# Patient Record
Sex: MALE | Race: BLACK OR AFRICAN AMERICAN | NOT HISPANIC OR LATINO
[De-identification: names, ages, dates, MRNs, and addresses within clinical notes are randomized per-mention and may not be internally consistent; named-entity substitution may affect disease eponyms.]

---

## 2021-02-16 PROBLEM — Z00.00 ENCOUNTER FOR PREVENTIVE HEALTH EXAMINATION: Status: ACTIVE | Noted: 2021-02-16

## 2021-02-19 DIAGNOSIS — Z78.9 OTHER SPECIFIED HEALTH STATUS: ICD-10-CM

## 2021-02-19 DIAGNOSIS — Z83.3 FAMILY HISTORY OF DIABETES MELLITUS: ICD-10-CM

## 2021-02-19 DIAGNOSIS — I10 ESSENTIAL (PRIMARY) HYPERTENSION: ICD-10-CM

## 2021-02-19 RX ORDER — LOSARTAN POTASSIUM 100 MG/1
TABLET, FILM COATED ORAL
Refills: 0 | Status: ACTIVE | COMMUNITY

## 2021-02-22 ENCOUNTER — APPOINTMENT (OUTPATIENT)
Dept: UROLOGY | Facility: CLINIC | Age: 71
End: 2021-02-22
Payer: MEDICARE

## 2021-02-22 VITALS
HEART RATE: 75 BPM | DIASTOLIC BLOOD PRESSURE: 106 MMHG | SYSTOLIC BLOOD PRESSURE: 152 MMHG | TEMPERATURE: 97.5 F | OXYGEN SATURATION: 97 %

## 2021-02-22 LAB
BILIRUB UR QL STRIP: NORMAL
CLARITY UR: CLEAR
COLLECTION METHOD: NORMAL
GLUCOSE UR-MCNC: NORMAL
HCG UR QL: 0.2 EU/DL
HGB UR QL STRIP.AUTO: NORMAL
KETONES UR-MCNC: NORMAL
LEUKOCYTE ESTERASE UR QL STRIP: NORMAL
NITRITE UR QL STRIP: NORMAL
PH UR STRIP: 5.5
PROT UR STRIP-MCNC: NORMAL
SP GR UR STRIP: 1

## 2021-02-22 PROCEDURE — 81003 URINALYSIS AUTO W/O SCOPE: CPT | Mod: QW

## 2021-02-22 PROCEDURE — 99215 OFFICE O/P EST HI 40 MIN: CPT | Mod: 25

## 2021-02-22 PROCEDURE — 99072 ADDL SUPL MATRL&STAF TM PHE: CPT

## 2021-02-22 PROCEDURE — 76857 US EXAM PELVIC LIMITED: CPT

## 2021-02-22 RX ORDER — ROSUVASTATIN CALCIUM 20 MG/1
20 TABLET, FILM COATED ORAL
Qty: 90 | Refills: 0 | Status: ACTIVE | COMMUNITY
Start: 2020-05-06

## 2021-02-22 RX ORDER — ATENOLOL 50 MG/1
50 TABLET ORAL
Qty: 90 | Refills: 0 | Status: ACTIVE | COMMUNITY
Start: 2021-01-29

## 2021-02-22 NOTE — LETTER BODY
[Dear  ___] : Dear  [unfilled], [Consult Letter:] : I had the pleasure of evaluating your patient, [unfilled]. [Please see my note below.] : Please see my note below. [Consult Closing:] : Thank you very much for allowing me to participate in the care of this patient.  If you have any questions, please do not hesitate to contact me. [Sincerely,] : Sincerely, [FreeTextEntry3] : Denton Núñez MD, FACS

## 2021-02-22 NOTE — HISTORY OF PRESENT ILLNESS
[FreeTextEntry1] : Mr. ANTIONETTE FISHER comes in today for his urologic follow-up.  He presents with mild lower urinary tract symptoms (predominantly frequency) with nocturia x1.  \par IPSS: 5/35\par Sono: 49cc PVR; 32cc prostate\par \par Due to his elevated PSA and a strong family history of prostate cancer, Mr. Fisher underwent a prostate biopsy in June 2019, which identified only BPH. \par \par He reports normal erections. \par \par PSAs: 5/20/19--4.35 (20%); 4/29/19--6.2 (21%); \par \par 4K: 5/20/19--11%;\par \par Prostate Bx: 6/17/19--BPH;

## 2021-02-22 NOTE — PHYSICAL EXAM
[General Appearance - Well Developed] : well developed [General Appearance - Well Nourished] : well nourished [Normal Appearance] : normal appearance [Well Groomed] : well groomed [General Appearance - In No Acute Distress] : no acute distress [Abdomen Soft] : soft [Abdomen Tenderness] : non-tender [Costovertebral Angle Tenderness] : no ~M costovertebral angle tenderness [Urinary Bladder Findings] : the bladder was normal on palpation [Urethral Meatus] : meatus normal [Scrotum] : the scrotum was normal [Testes Mass (___cm)] : there were no testicular masses [No Prostate Nodules] : no prostate nodules [Edema] : no peripheral edema [] : no respiratory distress [Respiration, Rhythm And Depth] : normal respiratory rhythm and effort [Exaggerated Use Of Accessory Muscles For Inspiration] : no accessory muscle use [Oriented To Time, Place, And Person] : oriented to person, place, and time [Affect] : the affect was normal [Mood] : the mood was normal [Not Anxious] : not anxious [Normal Station and Gait] : the gait and station were normal for the patient's age [No Focal Deficits] : no focal deficits [No Palpable Adenopathy] : no palpable adenopathy [Abdomen Mass (___ Cm)] : no abdominal mass palpated [Abdomen Hernia] : no hernia was discovered [Penis Abnormality] : normal circumcised penis [Testes Tenderness] : no tenderness of the testes [Prostate Tenderness] : the prostate was not tender [Skin Color & Pigmentation] : normal skin color and pigmentation [Heart Rate And Rhythm] : Heart rate and rhythm were normal

## 2021-02-22 NOTE — ADDENDUM
[FreeTextEntry1] : A portion of this note was written by [Sheldon Bunn] on 02/19/2021 acting as a scribe for Dr. Núñez. \par \par I have personally reviewed the chart and agree that the record accurately reflects my personal performance of the history, physical exam, assessment, and plan.

## 2021-02-22 NOTE — ASSESSMENT
[FreeTextEntry1] : I discussed the findings and options with . ANTIONETTE FISHER in detail.  No intervention is warranted for his minimal stable lower urinary tract symptoms.  Regarding the chronic PSA elevation, I will call him with the repeat.  I also advised that he consider a baseline prostate MRI understanding the limitations of this and any of the other available ancillary tests used for the diagnosis of prostate cancer.\par \par Providing there are no new problems I look forward to seeing Mr. Fisher in 1 year (bladder sono, PSA).\par

## 2021-02-23 LAB — PSA SERPL-MCNC: 4.6 NG/ML

## 2021-03-03 ENCOUNTER — NON-APPOINTMENT (OUTPATIENT)
Age: 71
End: 2021-03-03

## 2022-06-22 ENCOUNTER — APPOINTMENT (OUTPATIENT)
Dept: OTOLARYNGOLOGY | Facility: CLINIC | Age: 72
End: 2022-06-22

## 2023-03-06 ENCOUNTER — APPOINTMENT (OUTPATIENT)
Dept: UROLOGY | Facility: CLINIC | Age: 73
End: 2023-03-06
Payer: MEDICARE

## 2023-03-06 VITALS
OXYGEN SATURATION: 97 % | TEMPERATURE: 97 F | BODY MASS INDEX: 27.49 KG/M2 | DIASTOLIC BLOOD PRESSURE: 92 MMHG | HEART RATE: 78 BPM | HEIGHT: 65 IN | SYSTOLIC BLOOD PRESSURE: 166 MMHG | WEIGHT: 165 LBS

## 2023-03-06 LAB
BILIRUB UR QL STRIP: NORMAL
CLARITY UR: CLEAR
COLLECTION METHOD: NORMAL
GLUCOSE UR-MCNC: NORMAL
HCG UR QL: 0.2 EU/DL
HGB UR QL STRIP.AUTO: ABNORMAL
KETONES UR-MCNC: NORMAL
LEUKOCYTE ESTERASE UR QL STRIP: NORMAL
NITRITE UR QL STRIP: NORMAL
PH UR STRIP: 5
PROT UR STRIP-MCNC: NORMAL
SP GR UR STRIP: 1

## 2023-03-06 PROCEDURE — 81003 URINALYSIS AUTO W/O SCOPE: CPT | Mod: QW

## 2023-03-06 PROCEDURE — 51798 US URINE CAPACITY MEASURE: CPT

## 2023-03-06 PROCEDURE — 99215 OFFICE O/P EST HI 40 MIN: CPT | Mod: 25

## 2023-03-06 NOTE — ASSESSMENT
[FreeTextEntry1] : I discussed the findings and options with . ANTIONETTE ESCOBAR in detail and obtained the outside PSA values. He does not want any intervention for his stable voiding symptoms.  If this remains elevated, he should have a baseline prostate MRI and possibly a prostate biopsy.\par \par The two biopsy techniques (transrectal and transperineal) were described with their pros and cons. He understands that [facility] requires that the procedure be performed exclusively transperineally using a fusion technique. Currently this is performed in the operating room under general anesthesia.  Biopsy related complications were outlined including bleeding (urinary/rectal/ejaculatory), infection, urosepsis (requiring prompt hospitalization), urinary retention. \par

## 2023-03-06 NOTE — ADDENDUM
[FreeTextEntry1] : A portion of this note was written by [Sheldon Bunn] on 03/03/2023 acting as a scribe for Dr. Núñez. \par \par I have personally reviewed the chart and agree that the record accurately reflects my personal performance of the history, physical exam, assessment, and plan.

## 2023-03-06 NOTE — PHYSICAL EXAM

## 2023-03-06 NOTE — HISTORY OF PRESENT ILLNESS
[FreeTextEntry1] : Mr. ANTIONETTE FISHER comes in today for his urologic follow-up after a hiatus of two years.  \par \par From his general urologic history, Mr. Fisher reports moderate stable lower urinary tract symptoms (predominantly frequency) with nocturia x 2. He has good urinary control. \par IPSS: 18/35\par Sono (performed to assess bladder emptying): PVR 18 cc, 65 cc prostate\par \par Due to his elevated PSA and a strong family history of prostate cancer, Mr. Fisher underwent a prostate biopsy in June 2019, which identified only BPH. His most recent PSA is reportedly elevated (records unavailable for my review).\par \par He reports normal erections. \par \par PSAs: 1/13/23--5.94; 4/18/22--6.36; 2/22/21--4.6; 1/11/21--4.25; 5/20/19--4.35 (20%); 4/29/19--6.2 (21%); \par \par 4K: 5/20/19--11%;\par \par Pathology (Prostate Biopsy): 6/17/19--BPH;

## 2023-03-06 NOTE — LETTER BODY
[Dear  ___] : Dear  [unfilled], [Consult Letter:] : I had the pleasure of evaluating your patient, [unfilled]. [Please see my note below.] : Please see my note below. [Consult Closing:] : Thank you very much for allowing me to participate in the care of this patient.  If you have any questions, please do not hesitate to contact me. [Sincerely,] : Sincerely, [DrTreasure  ___] : Dr. MERCER [FreeTextEntry3] : Denton Núñez MD, FACS

## 2023-03-07 ENCOUNTER — NON-APPOINTMENT (OUTPATIENT)
Age: 73
End: 2023-03-07

## 2023-03-07 LAB — PSA SERPL-MCNC: 5.58 NG/ML

## 2023-03-08 LAB — BACTERIA UR CULT: NORMAL

## 2023-03-17 ENCOUNTER — OFFICE (OUTPATIENT)
Dept: URBAN - METROPOLITAN AREA CLINIC 28 | Facility: CLINIC | Age: 73
Setting detail: OPHTHALMOLOGY
End: 2023-03-17
Payer: COMMERCIAL

## 2023-03-17 DIAGNOSIS — H52.4: ICD-10-CM

## 2023-03-17 DIAGNOSIS — H11.152: ICD-10-CM

## 2023-03-17 DIAGNOSIS — H11.041: ICD-10-CM

## 2023-03-17 DIAGNOSIS — H16.223: ICD-10-CM

## 2023-03-17 PROBLEM — H52.7 REFRACTIVE ERROR: Status: ACTIVE | Noted: 2023-03-17

## 2023-03-17 PROCEDURE — 92012 INTRM OPH EXAM EST PATIENT: CPT | Performed by: SPECIALIST

## 2023-03-17 PROCEDURE — 92015 DETERMINE REFRACTIVE STATE: CPT | Performed by: SPECIALIST

## 2023-03-17 ASSESSMENT — AXIALLENGTH_DERIVED
OS_AL: 24.6184
OS_AL: 24.3561

## 2023-03-17 ASSESSMENT — TONOMETRY
OS_IOP_MMHG: 16
OD_IOP_MMHG: 14

## 2023-03-17 ASSESSMENT — REFRACTION_CURRENTRX
OS_VPRISM_DIRECTION: PROGS
OD_OVR_VA: 20/
OS_AXIS: 091
OD_CYLINDER: -1.00
OD_ADD: +2.50
OD_AXIS: 138
OS_ADD: +1.50
OS_SPHERE: +1.25
OS_OVR_VA: 20/
OS_OVR_VA: 20/
OS_SPHERE: +1.25
OS_ADD: +2.50
OS_AXIS: 095
OD_SPHERE: +0.50
OS_CYLINDER: -3.50
OD_AXIS: 132
OD_ADD: +1.50
OD_OVR_VA: 20/
OD_SPHERE: +0.25
OS_CYLINDER: -3.50
OD_VPRISM_DIRECTION: PROGS
OD_CYLINDER: -1.50

## 2023-03-17 ASSESSMENT — SPHEQUIV_DERIVED
OD_SPHEQUIV: 0.375
OS_SPHEQUIV: -0.875
OD_SPHEQUIV: 0.125
OS_SPHEQUIV: -0.25

## 2023-03-17 ASSESSMENT — VISUAL ACUITY
OS_BCVA: 20/40-1
OD_BCVA: 20/40+2

## 2023-03-17 ASSESSMENT — REFRACTION_MANIFEST
OD_ADD: +2.75
OD_AXIS: 148
OS_SPHERE: +1.00
OD_CYLINDER: -1.25
OS_AXIS: 100
OS_CYLINDER: -3.75
OS_ADD: +2.75
OD_SPHERE: +0.75

## 2023-03-17 ASSESSMENT — CONFRONTATIONAL VISUAL FIELD TEST (CVF)
OD_FINDINGS: FULL
OS_FINDINGS: FULL

## 2023-03-17 ASSESSMENT — REFRACTION_AUTOREFRACTION
OS_SPHERE: +2.00
OD_CYLINDER: -1.75
OS_AXIS: 103
OS_CYLINDER: -4.50
OD_SPHERE: +1.25
OD_AXIS: 149

## 2023-03-17 ASSESSMENT — KERATOMETRY
OS_K1POWER_DIOPTERS: 40.25
OD_K1POWER_DIOPTERS: UNABLE
METHOD_AUTO_MANUAL: AUTO
OS_AXISANGLE_DEGREES: 012
OS_K2POWER_DIOPTERS: 43.25

## 2023-03-17 ASSESSMENT — CORNEAL PTERYGIUM: OD_PTERYGIUM: NASAL

## 2023-03-17 ASSESSMENT — SUPERFICIAL PUNCTATE KERATITIS (SPK)
OS_SPK: T 1+
OD_SPK: 1+ 2+

## 2023-04-14 ENCOUNTER — OFFICE (OUTPATIENT)
Dept: URBAN - METROPOLITAN AREA CLINIC 28 | Facility: CLINIC | Age: 73
Setting detail: OPHTHALMOLOGY
End: 2023-04-14
Payer: COMMERCIAL

## 2023-04-14 DIAGNOSIS — H16.223: ICD-10-CM

## 2023-04-14 DIAGNOSIS — H11.153: ICD-10-CM

## 2023-04-14 DIAGNOSIS — H11.041: ICD-10-CM

## 2023-04-14 PROCEDURE — 92012 INTRM OPH EXAM EST PATIENT: CPT | Performed by: SPECIALIST

## 2023-04-14 ASSESSMENT — REFRACTION_CURRENTRX
OS_ADD: +2.50
OD_SPHERE: +0.50
OS_CYLINDER: -3.50
OS_CYLINDER: -3.50
OS_AXIS: 095
OS_AXIS: 091
OS_OVR_VA: 20/
OD_AXIS: 138
OD_AXIS: 132
OD_OVR_VA: 20/
OD_VPRISM_DIRECTION: PROGS
OS_SPHERE: +1.25
OD_ADD: +2.50
OD_SPHERE: +0.25
OD_CYLINDER: -1.50
OS_VPRISM_DIRECTION: PROGS
OS_ADD: +1.50
OD_OVR_VA: 20/
OS_OVR_VA: 20/
OD_CYLINDER: -1.00
OS_SPHERE: +1.25
OD_ADD: +1.50

## 2023-04-14 ASSESSMENT — REFRACTION_MANIFEST
OD_AXIS: 148
OS_ADD: +2.75
OS_CYLINDER: -3.75
OD_CYLINDER: -1.25
OS_SPHERE: +1.00
OD_ADD: +2.75
OS_AXIS: 100
OD_SPHERE: +0.75

## 2023-04-14 ASSESSMENT — CONFRONTATIONAL VISUAL FIELD TEST (CVF)
OD_FINDINGS: FULL
OS_FINDINGS: FULL

## 2023-04-14 ASSESSMENT — SUPERFICIAL PUNCTATE KERATITIS (SPK)
OS_SPK: T
OD_SPK: T

## 2023-04-14 ASSESSMENT — REFRACTION_AUTOREFRACTION
OD_CYLINDER: -3.00
OD_AXIS: 161
OS_AXIS: 102
OS_CYLINDER: -3.75
OS_SPHERE: +1.75
OD_SPHERE: +2.50

## 2023-04-14 ASSESSMENT — KERATOMETRY
OS_K1POWER_DIOPTERS: 40.25
METHOD_AUTO_MANUAL: AUTO
OS_K2POWER_DIOPTERS: 43.50
OS_AXISANGLE_DEGREES: 018
OD_K1POWER_DIOPTERS: UNABLE

## 2023-04-14 ASSESSMENT — SPHEQUIV_DERIVED
OS_SPHEQUIV: -0.875
OD_SPHEQUIV: 1
OS_SPHEQUIV: -0.125
OD_SPHEQUIV: 0.125

## 2023-04-14 ASSESSMENT — AXIALLENGTH_DERIVED
OS_AL: 24.5685
OS_AL: 24.2556

## 2023-04-14 ASSESSMENT — CORNEAL PTERYGIUM: OD_PTERYGIUM: NASAL

## 2023-04-14 ASSESSMENT — VISUAL ACUITY
OS_BCVA: 20/40+2
OD_BCVA: 20/25-

## 2023-07-25 ENCOUNTER — OUTPATIENT (OUTPATIENT)
Dept: OUTPATIENT SERVICES | Facility: HOSPITAL | Age: 73
LOS: 1 days | End: 2023-07-25
Payer: MEDICARE

## 2023-07-25 PROCEDURE — 73630 X-RAY EXAM OF FOOT: CPT | Mod: 26,LT

## 2023-07-25 PROCEDURE — 73600 X-RAY EXAM OF ANKLE: CPT | Mod: 26,LT

## 2023-07-25 PROCEDURE — 73630 X-RAY EXAM OF FOOT: CPT

## 2023-07-25 PROCEDURE — 73600 X-RAY EXAM OF ANKLE: CPT

## 2024-04-05 PROBLEM — R97.20 ELEVATED PSA: Status: ACTIVE | Noted: 2021-02-19

## 2024-04-05 PROBLEM — Z80.42 FAMILY HISTORY OF PROSTATE CANCER: Status: ACTIVE | Noted: 2021-02-19

## 2024-04-05 PROBLEM — R39.9 LOWER URINARY TRACT SYMPTOMS (LUTS): Status: ACTIVE | Noted: 2021-02-19

## 2024-04-05 PROBLEM — R33.9 URINARY RETENTION: Status: ACTIVE | Noted: 2021-02-19

## 2024-04-08 ENCOUNTER — APPOINTMENT (OUTPATIENT)
Dept: UROLOGY | Facility: CLINIC | Age: 74
End: 2024-04-08
Payer: MEDICARE

## 2024-04-08 DIAGNOSIS — R97.20 ELEVATED PROSTATE, SPECIFIC ANTIGEN [PSA]: ICD-10-CM

## 2024-04-08 DIAGNOSIS — R33.9 RETENTION OF URINE, UNSPECIFIED: ICD-10-CM

## 2024-04-08 DIAGNOSIS — Z80.42 FAMILY HISTORY OF MALIGNANT NEOPLASM OF PROSTATE: ICD-10-CM

## 2024-04-08 DIAGNOSIS — R39.9 UNSPECIFIED SYMPTOMS AND SIGNS INVOLVING THE GENITOURINARY SYSTEM: ICD-10-CM

## 2024-04-08 PROCEDURE — 51798 US URINE CAPACITY MEASURE: CPT

## 2024-04-08 PROCEDURE — 99215 OFFICE O/P EST HI 40 MIN: CPT | Mod: 25

## 2024-04-08 NOTE — ASSESSMENT
[FreeTextEntry1] : I discussed the findings and options with Mr. ANTIONETTE ESCOBAR in detail.  He is satisfied with his urination and will continue simply monitoring his progress.  I reviewed behavioral modification with him focusing on decreasing the nocturia.  A PSA is pending and we will call him with that result.  Providing it remains stable he will follow-up in 1 year (PSA, entheses; otherwise, he should proceed with a prostate MRI as discussed.

## 2024-04-08 NOTE — LETTER BODY
[Dear  ___] : Dear  [unfilled], [Consult Letter:] : I had the pleasure of evaluating your patient, [unfilled]. [Please see my note below.] : Please see my note below. [Consult Closing:] : Thank you very much for allowing me to participate in the care of this patient.  If you have any questions, please do not hesitate to contact me. [Sincerely,] : Sincerely, [DrTreasure  ___] : Dr. MERCER [FreeTextEntry3] : Denton Núñez MD, FACS No

## 2024-04-08 NOTE — HISTORY OF PRESENT ILLNESS
[FreeTextEntry1] : Mr. ANTIONETTE FISHER returns for his annual urologic followup.  He reports moderate stable lower urinary tract symptoms (predominantly frequency) with nocturia x 2. He has good urinary control.  IPSS: 11/3 Sono (performed to assess bladder emptying): 6cc PVR  Due to his elevated PSA and a family history of prostate cancer, Mr. Fisher underwent a prostate biopsy in June 2019, which identified only BPH.   He reports normal erections.   PSAs: 3/6/23--5.58; 1/13/23--5.94; 4/18/22--6.36; 2/22/21--4.6; 1/11/21--4.25; 5/20/19--4.35 (20%); 4/29/19--6.2 (21%);   4K: 5/20/19--11%;  Pathology (Prostate Biopsy): 6/17/19--BPH;  Yes

## 2024-04-09 LAB — PSA SERPL-MCNC: 6.67 NG/ML

## 2024-04-10 ENCOUNTER — NON-APPOINTMENT (OUTPATIENT)
Age: 74
End: 2024-04-10

## 2025-01-28 ENCOUNTER — OFFICE (OUTPATIENT)
Dept: URBAN - METROPOLITAN AREA CLINIC 28 | Facility: CLINIC | Age: 75
Setting detail: OPHTHALMOLOGY
End: 2025-01-28
Payer: COMMERCIAL

## 2025-01-28 DIAGNOSIS — H11.041: ICD-10-CM

## 2025-01-28 DIAGNOSIS — H43.812: ICD-10-CM

## 2025-01-28 DIAGNOSIS — H16.223: ICD-10-CM

## 2025-01-28 DIAGNOSIS — E11.3293: ICD-10-CM

## 2025-01-28 DIAGNOSIS — H11.153: ICD-10-CM

## 2025-01-28 DIAGNOSIS — H40.013: ICD-10-CM

## 2025-01-28 PROBLEM — Z96.1 PSEUDOPHAKIA: Status: ACTIVE | Noted: 2025-01-28

## 2025-01-28 PROCEDURE — 92014 COMPRE OPH EXAM EST PT 1/>: CPT | Performed by: SPECIALIST

## 2025-01-28 PROCEDURE — 92133 CPTRZD OPH DX IMG PST SGM ON: CPT | Performed by: SPECIALIST

## 2025-01-28 PROCEDURE — 92083 EXTENDED VISUAL FIELD XM: CPT | Performed by: SPECIALIST

## 2025-01-28 ASSESSMENT — REFRACTION_CURRENTRX
OD_SPHERE: +0.50
OD_AXIS: 138
OS_SPHERE: +1.25
OS_ADD: +2.50
OD_ADD: +2.50
OS_SPHERE: +1.25
OD_VPRISM_DIRECTION: PROGS
OS_OVR_VA: 20/
OS_AXIS: 091
OD_AXIS: 132
OS_AXIS: 095
OD_SPHERE: +0.25
OS_OVR_VA: 20/
OS_CYLINDER: -3.50
OS_ADD: +1.50
OD_OVR_VA: 20/
OS_VPRISM_DIRECTION: PROGS
OD_CYLINDER: -1.00
OS_CYLINDER: -3.50
OD_ADD: +1.50
OD_CYLINDER: -1.50
OD_OVR_VA: 20/

## 2025-01-28 ASSESSMENT — REFRACTION_MANIFEST
OS_ADD: +2.75
OD_ADD: +2.75
OS_CYLINDER: -3.75
OD_AXIS: 148
OS_AXIS: 100
OD_CYLINDER: -1.25
OS_SPHERE: +1.00
OD_SPHERE: +0.75

## 2025-01-28 ASSESSMENT — REFRACTION_AUTOREFRACTION
OS_AXIS: 095
OD_CYLINDER: -2.25
OS_CYLINDER: -3.75
OD_SPHERE: +1.50
OD_AXIS: 160
OS_SPHERE: +1.75

## 2025-01-28 ASSESSMENT — CONFRONTATIONAL VISUAL FIELD TEST (CVF)
OD_FINDINGS: FULL
OS_FINDINGS: FULL

## 2025-01-28 ASSESSMENT — TONOMETRY
OD_IOP_MMHG: 15
OS_IOP_MMHG: 15

## 2025-01-28 ASSESSMENT — KERATOMETRY
OS_K2POWER_DIOPTERS: 43.25
OS_K1POWER_DIOPTERS: 39.50
OD_K1POWER_DIOPTERS: UNABLE
METHOD_AUTO_MANUAL: AUTO
OS_AXISANGLE_DEGREES: 012

## 2025-01-28 ASSESSMENT — SUPERFICIAL PUNCTATE KERATITIS (SPK)
OD_SPK: 1+ 2+
OS_SPK: 1+ 2+

## 2025-01-28 ASSESSMENT — VISUAL ACUITY
OS_BCVA: 20/40-2
OD_BCVA: 20/30-1

## 2025-01-28 ASSESSMENT — CORNEAL PTERYGIUM: OD_PTERYGIUM: NASAL
